# Patient Record
(demographics unavailable — no encounter records)

---

## 2024-12-23 NOTE — DISCUSSION/SUMMARY
[FreeTextEntry1] : 3 yo with  improving URi, weight loss discussed nutrition in detail flu shot weight check at check up coming up

## 2024-12-23 NOTE — HISTORY OF PRESENT ILLNESS
[de-identified] : weight check [FreeTextEntry6] : had uri 1-2 weeks ago, still with slight rhinorrhea, no fever eating now better but picky

## 2025-02-24 NOTE — HISTORY OF PRESENT ILLNESS
[Mother] : mother [whole ___ oz/d] : consumes [unfilled] oz of whole milk per day [Fruit] : fruit [Vegetables] : vegetables [Meat] : meat [Grains] : grains [Eggs] : eggs [Fish] : fish [Dairy] : dairy [Normal] : Normal [Brushing teeth] : Brushing teeth [No] : Patient does not go to dentist yearly [de-identified] : drinks water

## 2025-02-24 NOTE — PHYSICAL EXAM

## 2025-02-24 NOTE — DISCUSSION/SUMMARY
[FreeTextEntry1] : 2.6 yo well, nml growth and development discussed nutrition, iron rich foods in conjunction with foods rich in vitamin C May switch to lower fat milk. Continue table foods, 3 meals with 2-3 snacks per day. Incorporate fluorinated water daily in a cup. Brush teeth twice a day with soft toothbrush. Recommend visit to dentist. When in car, keep child in rear-facing car seats until age 2, or until  the maximum height and weight for seat is reached. Put toddler to sleep in own bed. Help toddler to maintain consistent daily routines and sleep schedule. Toilet training discussed. Ensure home is safe. Use consistent, positive discipline. Read aloud to toddler. Limit screen time to no more than 2 hours per day.

## 2025-02-24 NOTE — DEVELOPMENTAL MILESTONES
[Urinates in a potty or toilet] : urinates in a potty or toilet [Pokes food with fork] : pokes food with fork [Uses pronouns correctly] : uses pronouns correctly [Explains the reason for things,] : explains the reason for things, such as needing a sweater when it's cold [Walks up steps, using one] : walks up steps, using one foot, then the other [Grasps crayon with thumb] : grasps crayon with thumb and fingers instead of fist [Catches a large ball] : catches a large ball [Copies a vertical line] : copies a vertical line [Yes] : Completed.